# Patient Record
Sex: FEMALE | Race: WHITE
[De-identification: names, ages, dates, MRNs, and addresses within clinical notes are randomized per-mention and may not be internally consistent; named-entity substitution may affect disease eponyms.]

---

## 2019-01-15 ENCOUNTER — HOSPITAL ENCOUNTER (EMERGENCY)
Dept: HOSPITAL 35 - ER | Age: 84
LOS: 1 days | Discharge: HOME | End: 2019-01-16
Payer: COMMERCIAL

## 2019-01-15 VITALS — HEIGHT: 65 IN | WEIGHT: 200 LBS | BODY MASS INDEX: 33.32 KG/M2

## 2019-01-15 DIAGNOSIS — E78.5: ICD-10-CM

## 2019-01-15 DIAGNOSIS — S91.312A: ICD-10-CM
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
## 2019-01-15 DIAGNOSIS — I10: ICD-10-CM
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
## 2019-01-15 DIAGNOSIS — S92.535A: Primary | ICD-10-CM

## 2019-01-15 DIAGNOSIS — I48.91: ICD-10-CM

## 2019-01-15 DIAGNOSIS — M47.817: ICD-10-CM
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
## 2019-01-15 DIAGNOSIS — Z90.710: ICD-10-CM
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
## 2019-01-15 DIAGNOSIS — Z87.891: ICD-10-CM

## 2019-01-15 DIAGNOSIS — J44.9: ICD-10-CM

## 2019-01-16 VITALS — SYSTOLIC BLOOD PRESSURE: 116 MMHG | DIASTOLIC BLOOD PRESSURE: 46 MMHG

## 2019-07-23 ENCOUNTER — HOSPITAL ENCOUNTER (INPATIENT)
Dept: HOSPITAL 35 - ER | Age: 84
LOS: 6 days | Discharge: NURSING, REHAB OR LONG-TERM CARE | DRG: 189 | End: 2019-07-29
Attending: INTERNAL MEDICINE | Admitting: INTERNAL MEDICINE
Payer: COMMERCIAL

## 2019-07-23 VITALS — DIASTOLIC BLOOD PRESSURE: 71 MMHG | SYSTOLIC BLOOD PRESSURE: 167 MMHG

## 2019-07-23 VITALS — DIASTOLIC BLOOD PRESSURE: 72 MMHG | SYSTOLIC BLOOD PRESSURE: 149 MMHG

## 2019-07-23 VITALS — BODY MASS INDEX: 31.24 KG/M2 | HEIGHT: 65 IN | WEIGHT: 187.5 LBS

## 2019-07-23 DIAGNOSIS — Z98.41: ICD-10-CM

## 2019-07-23 DIAGNOSIS — Z66: ICD-10-CM

## 2019-07-23 DIAGNOSIS — J44.1: ICD-10-CM

## 2019-07-23 DIAGNOSIS — J96.22: ICD-10-CM

## 2019-07-23 DIAGNOSIS — Z79.899: ICD-10-CM

## 2019-07-23 DIAGNOSIS — I48.2: ICD-10-CM

## 2019-07-23 DIAGNOSIS — I50.32: ICD-10-CM

## 2019-07-23 DIAGNOSIS — E78.5: ICD-10-CM

## 2019-07-23 DIAGNOSIS — Z87.891: ICD-10-CM

## 2019-07-23 DIAGNOSIS — Z98.42: ICD-10-CM

## 2019-07-23 DIAGNOSIS — Z90.710: ICD-10-CM

## 2019-07-23 DIAGNOSIS — Z99.81: ICD-10-CM

## 2019-07-23 DIAGNOSIS — I11.0: ICD-10-CM

## 2019-07-23 DIAGNOSIS — J96.21: Primary | ICD-10-CM

## 2019-07-23 LAB
ALBUMIN SERPL-MCNC: 3.5 G/DL (ref 3.4–5)
ALT SERPL-CCNC: 23 U/L (ref 30–65)
ANION GAP SERPL CALC-SCNC: < 0 MMOL/L (ref 7–16)
ANISOCYTOSIS BLD QL SMEAR: (no result)
AST SERPL-CCNC: 20 U/L (ref 15–37)
BASO STIPL BLD QL SMEAR: (no result)
BILIRUB SERPL-MCNC: 0.5 MG/DL
BUN SERPL-MCNC: 24 MG/DL (ref 7–18)
CALCIUM SERPL-MCNC: 9.1 MG/DL (ref 8.5–10.1)
CHLORIDE SERPL-SCNC: 101 MMOL/L (ref 98–107)
CO2 SERPL-SCNC: 42 MMOL/L (ref 21–32)
CREAT SERPL-MCNC: 1 MG/DL (ref 0.6–1)
EOSINOPHIL NFR BLD: 2 % (ref 0–3)
ERYTHROCYTE [DISTWIDTH] IN BLOOD BY AUTOMATED COUNT: 17.9 % (ref 10.5–14.5)
GLUCOSE SERPL-MCNC: 105 MG/DL (ref 74–106)
GRANULOCYTES NFR BLD MANUAL: 84 % (ref 36–66)
HCT VFR BLD CALC: 40.6 % (ref 37–47)
HGB BLD-MCNC: 12.3 GM/DL (ref 12–15)
LYMPHOCYTES NFR BLD AUTO: 7 % (ref 24–44)
MCH RBC QN AUTO: 24.6 PG (ref 26–34)
MCHC RBC AUTO-ENTMCNC: 30.2 G/DL (ref 28–37)
MCV RBC: 81.4 FL (ref 80–100)
MONOCYTES NFR BLD: 7 % (ref 1–8)
NEUTROPHILS # BLD: 9.9 THOU/UL (ref 1.4–8.2)
PLATELET # BLD: 230 THOU/UL (ref 150–400)
POLYCHROMASIA BLD QL SMEAR: (no result)
POTASSIUM SERPL-SCNC: 4.4 MMOL/L (ref 3.5–5.1)
PROT SERPL-MCNC: 8.5 G/DL (ref 6.4–8.2)
RBC # BLD AUTO: 4.99 MIL/UL (ref 4.2–5)
SODIUM SERPL-SCNC: 142 MMOL/L (ref 136–145)
TROPONIN I SERPL-MCNC: <0.06 NG/ML (ref ?–0.06)
WBC # BLD AUTO: 11.8 THOU/UL (ref 4–11)

## 2019-07-23 PROCEDURE — 10081 I&D PILONIDAL CYST COMP: CPT

## 2019-07-24 VITALS — SYSTOLIC BLOOD PRESSURE: 105 MMHG | DIASTOLIC BLOOD PRESSURE: 55 MMHG

## 2019-07-24 VITALS — SYSTOLIC BLOOD PRESSURE: 140 MMHG | DIASTOLIC BLOOD PRESSURE: 59 MMHG

## 2019-07-24 VITALS — SYSTOLIC BLOOD PRESSURE: 167 MMHG | DIASTOLIC BLOOD PRESSURE: 97 MMHG

## 2019-07-24 VITALS — DIASTOLIC BLOOD PRESSURE: 71 MMHG | SYSTOLIC BLOOD PRESSURE: 136 MMHG

## 2019-07-24 LAB
BE(VIVO): 9.9 MMOL/L
HCO3 BLD-SCNC: 37.8 MMOL/L (ref 22–26)
PCO2 BLD: 68.4 MMHG (ref 35–45)
PO2 BLD: 58 MMHG (ref 80–100)

## 2019-07-24 NOTE — NUR
PT ADMITED FORM ED AROUND 2230. PT STATES INCREASED SOA.  ASSESSMENT AS
CHARTED VSS. PT DENIES PAIN OR CONCERN. A&O2-4 PT FORGETFUL AND CONFUSED AT
TIMES, REORIENTED. 6 L HIGH FLOW NC. PT STATS O2 3 L HS AT HOME. ABX AND
STEROIDS PER EMAR. ADMISSION MED RECONCIL COMPLETE. NP SAW PT AT BEDSIDE. WILL
CONTINUE TO MONITOR AND WITH POC.

## 2019-07-24 NOTE — NUR
Assumed care of patient approximately 1730.  Pt alert and eating her dinner.
Pt has difficulty answering orientation questions but is able to engage in
conversation.  O2 at 3 liters. Atrial fib on monitor. Report given to RN
assuming care. Fall precautions in place.

## 2019-07-25 VITALS — SYSTOLIC BLOOD PRESSURE: 142 MMHG | DIASTOLIC BLOOD PRESSURE: 63 MMHG

## 2019-07-25 VITALS — DIASTOLIC BLOOD PRESSURE: 78 MMHG | SYSTOLIC BLOOD PRESSURE: 122 MMHG

## 2019-07-25 VITALS — DIASTOLIC BLOOD PRESSURE: 62 MMHG | SYSTOLIC BLOOD PRESSURE: 123 MMHG

## 2019-07-25 VITALS — DIASTOLIC BLOOD PRESSURE: 53 MMHG | SYSTOLIC BLOOD PRESSURE: 110 MMHG

## 2019-07-25 VITALS — DIASTOLIC BLOOD PRESSURE: 66 MMHG | SYSTOLIC BLOOD PRESSURE: 127 MMHG

## 2019-07-25 LAB
ANION GAP SERPL CALC-SCNC: 5 MMOL/L (ref 7–16)
BUN SERPL-MCNC: 30 MG/DL (ref 7–18)
CALCIUM SERPL-MCNC: 9 MG/DL (ref 8.5–10.1)
CHLORIDE SERPL-SCNC: 100 MMOL/L (ref 98–107)
CO2 SERPL-SCNC: 37 MMOL/L (ref 21–32)
CREAT SERPL-MCNC: 0.8 MG/DL (ref 0.6–1)
ERYTHROCYTE [DISTWIDTH] IN BLOOD BY AUTOMATED COUNT: 17.8 % (ref 10.5–14.5)
GLUCOSE SERPL-MCNC: 156 MG/DL (ref 74–106)
HCT VFR BLD CALC: 37.7 % (ref 37–47)
HGB BLD-MCNC: 11.4 GM/DL (ref 12–15)
MCH RBC QN AUTO: 24.2 PG (ref 26–34)
MCHC RBC AUTO-ENTMCNC: 30.3 G/DL (ref 28–37)
MCV RBC: 80 FL (ref 80–100)
PLATELET # BLD: 248 THOU/UL (ref 150–400)
POTASSIUM SERPL-SCNC: 3.9 MMOL/L (ref 3.5–5.1)
RBC # BLD AUTO: 4.71 MIL/UL (ref 4.2–5)
SODIUM SERPL-SCNC: 142 MMOL/L (ref 136–145)
WBC # BLD AUTO: 13.2 THOU/UL (ref 4–11)

## 2019-07-25 NOTE — NUR
ASSUMED PT CARE AT 1900. PT ALERT, ORIENTED TO SELF, CONFUSED AND FORGETFUL.
VITAL SIGNS STABLE, ASSESSMENT AS CHARTED. NO COMPLAINTS OF PAIN. PT CHANGED
AS NEEDED. FREQUENT CHECKS. NO ACUTE CHANGES THROUGH THE NIGHT. PT RESTED WELL
THROUGH THE NIGHT. WILL CONTINUE TO MONITOR.

## 2019-07-25 NOTE — NUR
ASSUMED CARE OF PATIENT AT 0700. PATIENT IS ALERT TO SELF ONLY AND INCREDIBLY
PLEASANT. PATIENT IS AFIB ON THE MONITOR. ASSESSMENTS COMPLETED. PATIENT
RECEIVED IV ABX AND COMPLETED TREATMENT WITHOUT COMPLAINING OR FIDGITING WITH
IV SITES. PATIENT COMPLIANT WITH ALL TREATMENTS. DENIES ANY PAIN. PATIENT HAS
NON PRODUCTIVE COUGH. PATIENT IS TO CONTINUE WITH POC.

## 2019-07-26 VITALS — SYSTOLIC BLOOD PRESSURE: 149 MMHG | DIASTOLIC BLOOD PRESSURE: 57 MMHG

## 2019-07-26 VITALS — SYSTOLIC BLOOD PRESSURE: 130 MMHG | DIASTOLIC BLOOD PRESSURE: 69 MMHG

## 2019-07-26 VITALS — SYSTOLIC BLOOD PRESSURE: 148 MMHG | DIASTOLIC BLOOD PRESSURE: 60 MMHG

## 2019-07-26 VITALS — DIASTOLIC BLOOD PRESSURE: 78 MMHG | SYSTOLIC BLOOD PRESSURE: 136 MMHG

## 2019-07-26 NOTE — NUR
FAXED CLINICAL UPDATE TO [facility] SPOKE WITH CYRUS IN ADM THAT PT POSS.
DC OVER WEEKEND. IF PT DISCHARGES PLEASE FAX DC ORDERS/SUMMARY [phone]
AND CALL REPORT [phone] AND THEY WILL ARRANGE TRANSPORTATION.

## 2019-07-26 NOTE — NUR
ASSESSMENT AS CHARTED, VSS, ALERT AND ORIENTED TO SELF, PATIENT INCONTINENT OF
BOWEL AND BLADDER, UP TO CHAIR WITH PT, WILL CONTINUE TO MONITOR.

## 2019-07-26 NOTE — NUR
ASSESSMENTS AS CHARTED. PATIENT PLEASANT, BUT CONFUSSED ABOUT EVERYTING BUT
WHO SHE IS AND HER BIRTHDAY. PATIENT CURRENTLY LIVES AT [facility].
INCONTINENT OF BOWEL AND BLADDER. PATIENT WORKING WITH PT/OT WHILE HERE. PER
FAMILY SHE NORMALLY SITS IN A WHEELCHAIR AND PROPELS HERSELF AROUND THE
FACILITY. FALL PRECAUTIONS IN PLACE, NOT IMPULSIVE. PLANS TO RETURN TO
[facility] TO CONTINUE PT/OT.

## 2019-07-27 VITALS — DIASTOLIC BLOOD PRESSURE: 67 MMHG | SYSTOLIC BLOOD PRESSURE: 152 MMHG

## 2019-07-27 VITALS — DIASTOLIC BLOOD PRESSURE: 58 MMHG | SYSTOLIC BLOOD PRESSURE: 142 MMHG

## 2019-07-27 VITALS — SYSTOLIC BLOOD PRESSURE: 133 MMHG | DIASTOLIC BLOOD PRESSURE: 65 MMHG

## 2019-07-27 VITALS — DIASTOLIC BLOOD PRESSURE: 65 MMHG | SYSTOLIC BLOOD PRESSURE: 146 MMHG

## 2019-07-27 VITALS — SYSTOLIC BLOOD PRESSURE: 117 MMHG | DIASTOLIC BLOOD PRESSURE: 55 MMHG

## 2019-07-27 LAB
ANION GAP SERPL CALC-SCNC: 2 MMOL/L (ref 7–16)
BUN SERPL-MCNC: 35 MG/DL (ref 7–18)
CALCIUM SERPL-MCNC: 9.1 MG/DL (ref 8.5–10.1)
CHLORIDE SERPL-SCNC: 102 MMOL/L (ref 98–107)
CO2 SERPL-SCNC: 39 MMOL/L (ref 21–32)
CREAT SERPL-MCNC: 0.8 MG/DL (ref 0.6–1)
ERYTHROCYTE [DISTWIDTH] IN BLOOD BY AUTOMATED COUNT: 17.7 % (ref 10.5–14.5)
GLUCOSE SERPL-MCNC: 156 MG/DL (ref 74–106)
HCT VFR BLD CALC: 40.3 % (ref 37–47)
HGB BLD-MCNC: 12.2 GM/DL (ref 12–15)
MCH RBC QN AUTO: 24.5 PG (ref 26–34)
MCHC RBC AUTO-ENTMCNC: 30.3 G/DL (ref 28–37)
MCV RBC: 80.7 FL (ref 80–100)
PLATELET # BLD: 245 THOU/UL (ref 150–400)
POTASSIUM SERPL-SCNC: 4.2 MMOL/L (ref 3.5–5.1)
RBC # BLD AUTO: 4.99 MIL/UL (ref 4.2–5)
SODIUM SERPL-SCNC: 143 MMOL/L (ref 136–145)
WBC # BLD AUTO: 11.7 THOU/UL (ref 4–11)

## 2019-07-27 NOTE — NUR
ASSESSMENTS AS CHARTED. PATIENT STARTED ON EXTERNAL FEMALE CATHETER ATTACHED
TO SUCTION AFTER RECEIVING REPORT OF FREQUENT INCONTINENT URINATION. PATIENT
ON 3 LITERS O2. CARE PLAN IS TO WEAN PATIENT OFF STEROIDS AND TRY TO WEAN DOWN
OXYGEN NEEDS.

## 2019-07-28 VITALS — DIASTOLIC BLOOD PRESSURE: 64 MMHG | SYSTOLIC BLOOD PRESSURE: 139 MMHG

## 2019-07-28 VITALS — DIASTOLIC BLOOD PRESSURE: 66 MMHG | SYSTOLIC BLOOD PRESSURE: 135 MMHG

## 2019-07-28 VITALS — SYSTOLIC BLOOD PRESSURE: 130 MMHG | DIASTOLIC BLOOD PRESSURE: 61 MMHG

## 2019-07-28 VITALS — SYSTOLIC BLOOD PRESSURE: 115 MMHG | DIASTOLIC BLOOD PRESSURE: 56 MMHG

## 2019-07-28 VITALS — SYSTOLIC BLOOD PRESSURE: 116 MMHG | DIASTOLIC BLOOD PRESSURE: 45 MMHG

## 2019-07-28 NOTE — NUR
ASSESSMENTS AS CHARTED. PATIENT RESTING IN BED DURING SHIFT. PATIENT RECEIVING
IV STEROIDS AND ANTIBIOTICS. USING EXTERNAL FEMALE CATHETER. FALL PREVENTION
IN PLACE. PLAN OF CARE TO WEAN OFF STEROIDS AND RETURN TO [place] WITH
SKILLED CARE.

## 2019-07-29 VITALS — DIASTOLIC BLOOD PRESSURE: 62 MMHG | SYSTOLIC BLOOD PRESSURE: 140 MMHG

## 2019-07-29 VITALS — SYSTOLIC BLOOD PRESSURE: 151 MMHG | DIASTOLIC BLOOD PRESSURE: 82 MMHG

## 2019-07-29 VITALS — DIASTOLIC BLOOD PRESSURE: 70 MMHG | SYSTOLIC BLOOD PRESSURE: 128 MMHG

## 2019-07-29 NOTE — NUR
PT DISCHARGING TODAY BACK TO [facility] FOR SKILLED STAY. FAXED DC
ORDERS/SUMMARY TO FACILITY SPOKE WITH CYRUS IN ADM SHE RECEIVED DC ORDERS
AND ARRANGED TRANSPORTATION VIA [facility] [employer] [facility] FOR [phone] WITH 4L 02. LEFT MSG WITH
BOTH SONS (MARIMAR/DOMINIC) THAT THEIR MOTHER IS DISCHARGING TODAY BACK TO
[facility] AND IF ANY QUESTIONS TO CALL DCP AND LEFT PHONE NUMBER TO CALL.
UNIT NOTIFIED AND CHART COPY PER US. RN TO CALL [phone].

## 2019-07-29 NOTE — NUR
ASSUMED CARE OF PT AT SHIFT CHANGE. ASSESSMENTS AS CHARTED. MEDS GIVEN PER
MAR. PT ALERT AND ORIENTEDX3, FORGETFUL AT TIMES. UP MAX ASSIST, 4L O2
TOLERATING WELL. NO C/O PAIN, DENIES CP, SOB. APPETITE ADEQUATE, URINE OUTPUT
ADEQUATE, VSS. DC ORDERS ACKNOWLEDGED AND IMPLEMENTED. AHMET CALLED FOR
REPORT, REPORT GIVEN TO NURSE JENNIFER. PT LEFT UNIT AT APPROX 1510 BY MARGARITO HATCH,
IV REMOVED, TELE REMOVED. PT LEFT WITH ALL BELONGINGS.

## 2019-07-29 NOTE — NUR
A/O X 3.DENIES PAIN AND SOB.ON O2 4L NC.REPOSITION AS NEEDED.ON PUREWICK
EXTERNAL CATH.MONITOR SHOWS AFIB.CONTROLLED.PT STATES SHE FEELS BETTER THIS
MORNING MORE THAN ANY OTHER DAYS.POSSIBILITY OF D/C TODAY.WILL MONITOR AND
CONTINUE POC.

## 2020-02-03 ENCOUNTER — HOSPITAL ENCOUNTER (INPATIENT)
Dept: HOSPITAL 35 - ER | Age: 85
LOS: 7 days | Discharge: NURSING, REHAB OR LONG-TERM CARE | DRG: 871 | End: 2020-02-10
Attending: INTERNAL MEDICINE | Admitting: INTERNAL MEDICINE
Payer: COMMERCIAL

## 2020-02-03 VITALS — DIASTOLIC BLOOD PRESSURE: 50 MMHG | SYSTOLIC BLOOD PRESSURE: 128 MMHG

## 2020-02-03 VITALS — DIASTOLIC BLOOD PRESSURE: 63 MMHG | SYSTOLIC BLOOD PRESSURE: 109 MMHG

## 2020-02-03 VITALS — SYSTOLIC BLOOD PRESSURE: 118 MMHG | DIASTOLIC BLOOD PRESSURE: 54 MMHG

## 2020-02-03 VITALS — SYSTOLIC BLOOD PRESSURE: 99 MMHG | DIASTOLIC BLOOD PRESSURE: 58 MMHG

## 2020-02-03 VITALS — HEIGHT: 65.98 IN | BODY MASS INDEX: 33.91 KG/M2 | WEIGHT: 210.98 LBS

## 2020-02-03 VITALS — DIASTOLIC BLOOD PRESSURE: 81 MMHG | SYSTOLIC BLOOD PRESSURE: 135 MMHG

## 2020-02-03 VITALS — SYSTOLIC BLOOD PRESSURE: 125 MMHG | DIASTOLIC BLOOD PRESSURE: 59 MMHG

## 2020-02-03 VITALS — SYSTOLIC BLOOD PRESSURE: 100 MMHG | DIASTOLIC BLOOD PRESSURE: 67 MMHG

## 2020-02-03 DIAGNOSIS — I10: ICD-10-CM

## 2020-02-03 DIAGNOSIS — N39.0: ICD-10-CM

## 2020-02-03 DIAGNOSIS — Z99.81: ICD-10-CM

## 2020-02-03 DIAGNOSIS — J96.21: ICD-10-CM

## 2020-02-03 DIAGNOSIS — Z79.01: ICD-10-CM

## 2020-02-03 DIAGNOSIS — I48.91: ICD-10-CM

## 2020-02-03 DIAGNOSIS — B96.20: ICD-10-CM

## 2020-02-03 DIAGNOSIS — A41.9: Primary | ICD-10-CM

## 2020-02-03 DIAGNOSIS — Z98.41: ICD-10-CM

## 2020-02-03 DIAGNOSIS — J44.1: ICD-10-CM

## 2020-02-03 DIAGNOSIS — M54.30: ICD-10-CM

## 2020-02-03 DIAGNOSIS — Z98.42: ICD-10-CM

## 2020-02-03 DIAGNOSIS — M46.96: ICD-10-CM

## 2020-02-03 DIAGNOSIS — Z79.899: ICD-10-CM

## 2020-02-03 DIAGNOSIS — Z66: ICD-10-CM

## 2020-02-03 DIAGNOSIS — F03.90: ICD-10-CM

## 2020-02-03 DIAGNOSIS — J96.22: ICD-10-CM

## 2020-02-03 DIAGNOSIS — E78.5: ICD-10-CM

## 2020-02-03 DIAGNOSIS — Z90.710: ICD-10-CM

## 2020-02-03 LAB
ANION GAP SERPL CALC-SCNC: < 0 MMOL/L (ref 7–16)
BASOPHILS NFR BLD AUTO: 0.5 % (ref 0–2)
BE(VIVO): 6.1 MMOL/L
BILIRUB UR-MCNC: NEGATIVE MG/DL
BUN SERPL-MCNC: 17 MG/DL (ref 7–18)
CALCIUM SERPL-MCNC: 9.2 MG/DL (ref 8.5–10.1)
CHLORIDE SERPL-SCNC: 98 MMOL/L (ref 98–107)
CO2 SERPL-SCNC: 42 MMOL/L (ref 21–32)
COLOR UR: YELLOW
CREAT SERPL-MCNC: 0.7 MG/DL (ref 0.6–1)
EOSINOPHIL NFR BLD: 1.8 % (ref 0–3)
ERYTHROCYTE [DISTWIDTH] IN BLOOD BY AUTOMATED COUNT: 17.6 % (ref 10.5–14.5)
GLUCOSE SERPL-MCNC: 215 MG/DL (ref 74–106)
GRANULOCYTES NFR BLD MANUAL: 77.1 % (ref 36–66)
HCO3 BLD-SCNC: 37 MMOL/L (ref 22–26)
HCT VFR BLD CALC: 40.8 % (ref 37–47)
HGB BLD-MCNC: 12.1 GM/DL (ref 12–15)
KETONES UR STRIP-MCNC: NEGATIVE MG/DL
LYMPHOCYTES NFR BLD AUTO: 10.6 % (ref 24–44)
MCH RBC QN AUTO: 24.3 PG (ref 26–34)
MCHC RBC AUTO-ENTMCNC: 29.6 G/DL (ref 28–37)
MCV RBC: 82 FL (ref 80–100)
MONOCYTES NFR BLD: 10 % (ref 1–8)
NEUTROPHILS # BLD: 11.2 THOU/UL (ref 1.4–8.2)
PCO2 BLD: 94.9 MMHG (ref 35–45)
PLATELET # BLD: 243 THOU/UL (ref 150–400)
PO2 BLD: 238.6 MMHG (ref 80–100)
POTASSIUM SERPL-SCNC: 4.7 MMOL/L (ref 3.5–5.1)
RBC # BLD AUTO: 4.98 MIL/UL (ref 4.2–5)
RBC # UR STRIP: (no result) /UL
RBC #/AREA URNS HPF: (no result) /HPF (ref 0–2)
SODIUM SERPL-SCNC: 138 MMOL/L (ref 136–145)
SP GR UR STRIP: >= 1.03 (ref 1–1.03)
TROPONIN I SERPL-MCNC: <0.06 NG/ML (ref ?–0.06)
URINE CLARITY: (no result)
URINE GLUCOSE-RANDOM*: NEGATIVE
URINE LEUKOCYTES-REFLEX: (no result)
URINE NITRITE-REFLEX: NEGATIVE
URINE PROTEIN (DIPSTICK): (no result)
UROBILINOGEN UR STRIP-ACNC: 1 E.U./DL (ref 0.2–1)
WBC # BLD AUTO: 14.5 THOU/UL (ref 4–11)

## 2020-02-03 PROCEDURE — 5A09357 ASSISTANCE WITH RESPIRATORY VENTILATION, LESS THAN 24 CONSECUTIVE HOURS, CONTINUOUS POSITIVE AIRWAY PRESSURE: ICD-10-PCS | Performed by: INTERNAL MEDICINE

## 2020-02-03 PROCEDURE — 10879: CPT

## 2020-02-03 PROCEDURE — 10078: CPT

## 2020-02-03 NOTE — NUR
PATIENT ARRIVED FROM THE ER AT 1245, RESP AND NURSES AT BEDSIDE. PATIENT ONLY
REPONDING TO PAINFUL STIMULI. PATIENT ON BIPAP AND TOLERATING IT WELL. PATIENT
MADE COMFORTABLE IN THEROOM ANDPLACED ON THE MONITOR. AS THE DAY WENT ON THE
PATIENT WASABLE TO WAKE UP AND FOLLOW COMMANDS, PATIENT CONTINUES ON BIPAP. NO
FURTHER CONCERNS AT THIS TIME. WILL CONTINUE TO MONITOR AND CARE PER PLAN OF
CARE.

## 2020-02-03 NOTE — NUR
ATTEMPTED TO CALL REPORT FOR PATIENT. WAS INFORMED TO CALL BACK IN APPROX. 10
MINUTES. WILL FOLLOW UP.

## 2020-02-04 VITALS — DIASTOLIC BLOOD PRESSURE: 67 MMHG | SYSTOLIC BLOOD PRESSURE: 123 MMHG

## 2020-02-04 VITALS — DIASTOLIC BLOOD PRESSURE: 55 MMHG | SYSTOLIC BLOOD PRESSURE: 121 MMHG

## 2020-02-04 VITALS — SYSTOLIC BLOOD PRESSURE: 131 MMHG | DIASTOLIC BLOOD PRESSURE: 63 MMHG

## 2020-02-04 VITALS — DIASTOLIC BLOOD PRESSURE: 58 MMHG | SYSTOLIC BLOOD PRESSURE: 136 MMHG

## 2020-02-04 VITALS — SYSTOLIC BLOOD PRESSURE: 135 MMHG | DIASTOLIC BLOOD PRESSURE: 89 MMHG

## 2020-02-04 VITALS — DIASTOLIC BLOOD PRESSURE: 51 MMHG | SYSTOLIC BLOOD PRESSURE: 133 MMHG

## 2020-02-04 VITALS — DIASTOLIC BLOOD PRESSURE: 94 MMHG | SYSTOLIC BLOOD PRESSURE: 120 MMHG

## 2020-02-04 VITALS — DIASTOLIC BLOOD PRESSURE: 56 MMHG | SYSTOLIC BLOOD PRESSURE: 106 MMHG

## 2020-02-04 VITALS — SYSTOLIC BLOOD PRESSURE: 118 MMHG | DIASTOLIC BLOOD PRESSURE: 56 MMHG

## 2020-02-04 VITALS — SYSTOLIC BLOOD PRESSURE: 135 MMHG | DIASTOLIC BLOOD PRESSURE: 73 MMHG

## 2020-02-04 VITALS — SYSTOLIC BLOOD PRESSURE: 132 MMHG | DIASTOLIC BLOOD PRESSURE: 43 MMHG

## 2020-02-04 VITALS — DIASTOLIC BLOOD PRESSURE: 86 MMHG | SYSTOLIC BLOOD PRESSURE: 146 MMHG

## 2020-02-04 VITALS — SYSTOLIC BLOOD PRESSURE: 138 MMHG | DIASTOLIC BLOOD PRESSURE: 64 MMHG

## 2020-02-04 VITALS — SYSTOLIC BLOOD PRESSURE: 101 MMHG | DIASTOLIC BLOOD PRESSURE: 50 MMHG

## 2020-02-04 VITALS — DIASTOLIC BLOOD PRESSURE: 48 MMHG | SYSTOLIC BLOOD PRESSURE: 124 MMHG

## 2020-02-04 VITALS — DIASTOLIC BLOOD PRESSURE: 59 MMHG | SYSTOLIC BLOOD PRESSURE: 132 MMHG

## 2020-02-04 VITALS — DIASTOLIC BLOOD PRESSURE: 76 MMHG | SYSTOLIC BLOOD PRESSURE: 127 MMHG

## 2020-02-04 VITALS — DIASTOLIC BLOOD PRESSURE: 49 MMHG | SYSTOLIC BLOOD PRESSURE: 100 MMHG

## 2020-02-04 VITALS — SYSTOLIC BLOOD PRESSURE: 131 MMHG | DIASTOLIC BLOOD PRESSURE: 66 MMHG

## 2020-02-04 VITALS — DIASTOLIC BLOOD PRESSURE: 59 MMHG | SYSTOLIC BLOOD PRESSURE: 125 MMHG

## 2020-02-04 VITALS — SYSTOLIC BLOOD PRESSURE: 106 MMHG | DIASTOLIC BLOOD PRESSURE: 40 MMHG

## 2020-02-04 VITALS — DIASTOLIC BLOOD PRESSURE: 70 MMHG | SYSTOLIC BLOOD PRESSURE: 137 MMHG

## 2020-02-04 VITALS — DIASTOLIC BLOOD PRESSURE: 68 MMHG | SYSTOLIC BLOOD PRESSURE: 132 MMHG

## 2020-02-04 VITALS — DIASTOLIC BLOOD PRESSURE: 52 MMHG | SYSTOLIC BLOOD PRESSURE: 120 MMHG

## 2020-02-04 VITALS — SYSTOLIC BLOOD PRESSURE: 124 MMHG | DIASTOLIC BLOOD PRESSURE: 51 MMHG

## 2020-02-04 VITALS — SYSTOLIC BLOOD PRESSURE: 156 MMHG | DIASTOLIC BLOOD PRESSURE: 83 MMHG

## 2020-02-04 VITALS — DIASTOLIC BLOOD PRESSURE: 98 MMHG | SYSTOLIC BLOOD PRESSURE: 139 MMHG

## 2020-02-04 VITALS — SYSTOLIC BLOOD PRESSURE: 126 MMHG | DIASTOLIC BLOOD PRESSURE: 59 MMHG

## 2020-02-04 VITALS — DIASTOLIC BLOOD PRESSURE: 59 MMHG | SYSTOLIC BLOOD PRESSURE: 126 MMHG

## 2020-02-04 VITALS — DIASTOLIC BLOOD PRESSURE: 36 MMHG | SYSTOLIC BLOOD PRESSURE: 135 MMHG

## 2020-02-04 VITALS — SYSTOLIC BLOOD PRESSURE: 123 MMHG | DIASTOLIC BLOOD PRESSURE: 59 MMHG

## 2020-02-04 VITALS — SYSTOLIC BLOOD PRESSURE: 117 MMHG | DIASTOLIC BLOOD PRESSURE: 93 MMHG

## 2020-02-04 VITALS — DIASTOLIC BLOOD PRESSURE: 60 MMHG | SYSTOLIC BLOOD PRESSURE: 139 MMHG

## 2020-02-04 VITALS — SYSTOLIC BLOOD PRESSURE: 130 MMHG | DIASTOLIC BLOOD PRESSURE: 67 MMHG

## 2020-02-04 VITALS — SYSTOLIC BLOOD PRESSURE: 136 MMHG | DIASTOLIC BLOOD PRESSURE: 67 MMHG

## 2020-02-04 VITALS — SYSTOLIC BLOOD PRESSURE: 118 MMHG | DIASTOLIC BLOOD PRESSURE: 60 MMHG

## 2020-02-04 VITALS — SYSTOLIC BLOOD PRESSURE: 122 MMHG | DIASTOLIC BLOOD PRESSURE: 52 MMHG

## 2020-02-04 LAB
ANION GAP SERPL CALC-SCNC: 5 MMOL/L (ref 7–16)
BUN SERPL-MCNC: 30 MG/DL (ref 7–18)
CALCIUM SERPL-MCNC: 9 MG/DL (ref 8.5–10.1)
CHLORIDE SERPL-SCNC: 102 MMOL/L (ref 98–107)
CO2 SERPL-SCNC: 35 MMOL/L (ref 21–32)
CREAT SERPL-MCNC: 0.7 MG/DL (ref 0.6–1)
ERYTHROCYTE [DISTWIDTH] IN BLOOD BY AUTOMATED COUNT: 17.1 % (ref 10.5–14.5)
GLUCOSE SERPL-MCNC: 151 MG/DL (ref 74–106)
HCT VFR BLD CALC: 37.3 % (ref 37–47)
HGB BLD-MCNC: 11.5 GM/DL (ref 12–15)
MCH RBC QN AUTO: 24.8 PG (ref 26–34)
MCHC RBC AUTO-ENTMCNC: 30.8 G/DL (ref 28–37)
MCV RBC: 80.4 FL (ref 80–100)
PLATELET # BLD: 255 THOU/UL (ref 150–400)
POTASSIUM SERPL-SCNC: 5 MMOL/L (ref 3.5–5.1)
RBC # BLD AUTO: 4.64 MIL/UL (ref 4.2–5)
SODIUM SERPL-SCNC: 142 MMOL/L (ref 136–145)
WBC # BLD AUTO: 13.5 THOU/UL (ref 4–11)

## 2020-02-04 PROCEDURE — 5A09357 ASSISTANCE WITH RESPIRATORY VENTILATION, LESS THAN 24 CONSECUTIVE HOURS, CONTINUOUS POSITIVE AIRWAY PRESSURE: ICD-10-PCS | Performed by: INTERNAL MEDICINE

## 2020-02-04 NOTE — NUR
chart review, report from icu bedside nurse. pt up in bed, eye closed bipap
and resp therapy giving btx. per nurse pt more alert today to names, know she
in hospital. cm left message for brother sunni mares. navi spoke with cg at
[facility], " yes lives there ltc and she was going to move to the [place] [facility] in
locked unite on monday but took side trip 1st. she all over place self
propeling her wheel chair. she will destat when she takes her o2 off often.
she usually on 4L per nc. she has dementia, she is A & o x 2 with
forgetfulness.  its not that she is non-compliant it just her dementia and
will refuse cares for adl's.  she can feed self with set up. incontinent.
"/[facility] care giver.  will cont following as needed for dc needs.

## 2020-02-04 NOTE — NUR
PATIENT IS ON 8L HIGH FLOW NC. OFF BIPAP SENSE 1300. PATIENT HAS INTERMIENT
CONFUSION AT TIMES DUE TO DEMENTIA HX. PATIENT UNDERSTANDS SHES IN THE
HOSPITAL AND THE TIME ON DAY, THE MONTH AND WHO SHE IS. PATIENT IS RESTING
COMFORTABLY IN BED. WCM.

## 2020-02-04 NOTE — H
[facility]
[address]
[place]   [address]                     HISTORY AND PHYSICAL          
_______________________________________________________________________________
 
Name:       ALEXEI AQUINO            Room #:         241-P       ADM IN  
M.R.#:      [id number]                       Account #:      [id number]  
Admission:  02/03/20    Attend Phys:    LILO Jaffe
Discharge:              Date of Birth:  [birth year]  
                                                          Report #: [id number]
                                                                    [id number]   
_______________________________________________________________________________
THIS REPORT FOR:   //name//                          
 
CC: Nathan Kee
 
DATE OF SERVICE:  02/03/2020
 
 
CHIEF COMPLAINT:  Shortness of breath.
 
HISTORY OF PRESENT ILLNESS:  The patient is an 84-year-old female with a history
of COPD, who was sent to the Emergency Room from [facility] for
respiratory distress.  She was found at [facility] this morning in her room with
oxygen saturation noted as 49% on 2 liters of oxygen.  They gave her 2 breathing
treatments and called EMS and was brought to the Emergency Room.  She has been
drowsy and minimally alert, was placed on BiPAP.  She was admitted to ICU.
 
PAST MEDICAL HISTORY:  COPD, oxygen dependent, 2-4 liters nasal cannula.  She
had a history of hypercapnic hypoxic respiratory failure in 2019 requiring BiPAP
treatment, hypertension, dyslipidemia, history of atrial fibrillation, chronic
anticoagulation with Eliquis, lumbar degenerative arthritis.
 
PAST SURGICAL HISTORY:  She has had a hysterectomy.
 
FAMILY HISTORY:  Noncontributory.
 
SOCIAL HISTORY:  She has been living at [facility] for about
the last 2-3 years.  She was no longer able to care for herself at home.  She
has a 60-pack-year history of smoking, but quit few years ago.  No known chronic
alcohol history.
 
ALLERGIES:  None.
 
MEDICATIONS:  DuoNeb, lisinopril, Lipitor, Lasix, Breo-Ellipta, Pepcid,
verapamil, metoprolol, Eliquis 5 mg twice a day and recently doxycycline and
prednisone.
 
REVIEW OF SYSTEMS:  She is unable to give review.
 
OBJECTIVE:
VITAL SIGNS:  Temperature 37.3, pulse 91, respirations 16, blood pressure 99/58,
O2 sat 90-95% on BiPAP.
GENERAL:  She is lethargic, minimally arousable.
HEAD AND NECK:  Unremarkable.
LUNGS:  Diminished breath sounds.
HEART:  Regular, no murmur.
 
 
 
[facility]
[address]
[place]   [address]                     HISTORY AND PHYSICAL          
_______________________________________________________________________________
 
Name:       ALEXEI AQUINO            Room #:         [address] [place] [address] [place]  
[facility].#:      [id number]                       Account #:      [id number]  
Admission:  02/03/20    Attend Phys:    LILO Jaffe
Discharge:              Date of Birth:  [birth year]  
                                                          Report #: [id number]
                                                                    [id number]   
_______________________________________________________________________________
ABDOMEN:  Soft, normoactive bowel sounds.
EXTREMITIES:  No edema.
NEUROLOGIC:  She moves spontaneously all extremities.
 
LABORATORY DATA:  Reviewed.  Pertinent findings are pH 7.21, pCO2 of 95, pO2 of
240.  White count was 14, CO2 was 42.
 
Chest x-ray reviewed.
 
ASSESSMENT:
1.  Acute hypercapnic hypoxic respiratory failure.
2.  Chronic obstructive pulmonary disease, O2 dependent.
 
PLAN:  She has been admitted to ICU, placed on BiPAP and supportive measures
including antibiotics, steroids, and nebulized treatments.  I have spoken to her
brother at bedside and we will continue do not resuscitate orders as has been
her previous admission here and standing orders from [facility].
 
 
 
 
 
 
 
 
 
 
 
 
 
 
 
 
 
 
 
 
 
 
 
 
 
 
 
  <ELECTRONICALLY SIGNED>
   By: Niels Kee MD           
  02/04/20     1351
D: 02/03/20 1426                           _____________________________________
T: 02/03/20 1500                           Niels Kee MD             /nt

## 2020-02-04 NOTE — NUR
WOUND CARE CONSULT
ASSESSED SACRAL/COCCYX W/ STAFF RN, AREA PINK BLANCHABLE, NO WOUNDS, STAFF RN
STATES PT WAS WEARING DEPENDS/BRIEFS AT SNF, STATES AREA SLIGHTLY RED
YESTERDAY, PROBABLY DUE TO INCONT ASSOCIATED DERMATITIS
 
RECOMMENDATIONS
USE BARRIER MOISTURE CREAM BID, PRN, TURN MINIMAL Q2 HOURS AND PRN, PRESSURE
RELIEF, OFF LOADING, SUGGESTED PUREWICK CATH DUE TO INCONT
STAFF RN AWARE

## 2020-02-05 VITALS — SYSTOLIC BLOOD PRESSURE: 116 MMHG | DIASTOLIC BLOOD PRESSURE: 59 MMHG

## 2020-02-05 VITALS — DIASTOLIC BLOOD PRESSURE: 67 MMHG | SYSTOLIC BLOOD PRESSURE: 123 MMHG

## 2020-02-05 VITALS — SYSTOLIC BLOOD PRESSURE: 132 MMHG | DIASTOLIC BLOOD PRESSURE: 71 MMHG

## 2020-02-05 VITALS — SYSTOLIC BLOOD PRESSURE: 144 MMHG | DIASTOLIC BLOOD PRESSURE: 58 MMHG

## 2020-02-05 VITALS — DIASTOLIC BLOOD PRESSURE: 73 MMHG | SYSTOLIC BLOOD PRESSURE: 136 MMHG

## 2020-02-05 VITALS — DIASTOLIC BLOOD PRESSURE: 58 MMHG | SYSTOLIC BLOOD PRESSURE: 130 MMHG

## 2020-02-05 VITALS — DIASTOLIC BLOOD PRESSURE: 67 MMHG | SYSTOLIC BLOOD PRESSURE: 115 MMHG

## 2020-02-05 VITALS — SYSTOLIC BLOOD PRESSURE: 138 MMHG | DIASTOLIC BLOOD PRESSURE: 66 MMHG

## 2020-02-05 VITALS — DIASTOLIC BLOOD PRESSURE: 60 MMHG | SYSTOLIC BLOOD PRESSURE: 150 MMHG

## 2020-02-05 VITALS — SYSTOLIC BLOOD PRESSURE: 127 MMHG | DIASTOLIC BLOOD PRESSURE: 57 MMHG

## 2020-02-05 VITALS — SYSTOLIC BLOOD PRESSURE: 124 MMHG | DIASTOLIC BLOOD PRESSURE: 62 MMHG

## 2020-02-05 VITALS — SYSTOLIC BLOOD PRESSURE: 131 MMHG | DIASTOLIC BLOOD PRESSURE: 75 MMHG

## 2020-02-05 VITALS — DIASTOLIC BLOOD PRESSURE: 53 MMHG | SYSTOLIC BLOOD PRESSURE: 115 MMHG

## 2020-02-05 VITALS — DIASTOLIC BLOOD PRESSURE: 80 MMHG | SYSTOLIC BLOOD PRESSURE: 151 MMHG

## 2020-02-05 VITALS — DIASTOLIC BLOOD PRESSURE: 52 MMHG | SYSTOLIC BLOOD PRESSURE: 151 MMHG

## 2020-02-05 VITALS — SYSTOLIC BLOOD PRESSURE: 153 MMHG | DIASTOLIC BLOOD PRESSURE: 59 MMHG

## 2020-02-05 VITALS — SYSTOLIC BLOOD PRESSURE: 140 MMHG | DIASTOLIC BLOOD PRESSURE: 68 MMHG

## 2020-02-05 VITALS — SYSTOLIC BLOOD PRESSURE: 140 MMHG | DIASTOLIC BLOOD PRESSURE: 67 MMHG

## 2020-02-05 VITALS — SYSTOLIC BLOOD PRESSURE: 132 MMHG | DIASTOLIC BLOOD PRESSURE: 51 MMHG

## 2020-02-05 VITALS — DIASTOLIC BLOOD PRESSURE: 74 MMHG | SYSTOLIC BLOOD PRESSURE: 146 MMHG

## 2020-02-05 LAB
ANION GAP SERPL CALC-SCNC: 5 MMOL/L (ref 7–16)
BE(VIVO): 4.9 MMOL/L
BUN SERPL-MCNC: 34 MG/DL (ref 7–18)
CALCIUM SERPL-MCNC: 8.8 MG/DL (ref 8.5–10.1)
CHLORIDE SERPL-SCNC: 102 MMOL/L (ref 98–107)
CO2 SERPL-SCNC: 36 MMOL/L (ref 21–32)
CREAT SERPL-MCNC: 0.8 MG/DL (ref 0.6–1)
GLUCOSE SERPL-MCNC: 132 MG/DL (ref 74–106)
HCO3 BLD-SCNC: 35.1 MMOL/L (ref 22–26)
PCO2 BLD: 83.1 MMHG (ref 35–45)
PO2 BLD: 247.4 MMHG (ref 80–100)
POTASSIUM SERPL-SCNC: 4.8 MMOL/L (ref 3.5–5.1)
SODIUM SERPL-SCNC: 143 MMOL/L (ref 136–145)

## 2020-02-05 PROCEDURE — 5A09357 ASSISTANCE WITH RESPIRATORY VENTILATION, LESS THAN 24 CONSECUTIVE HOURS, CONTINUOUS POSITIVE AIRWAY PRESSURE: ICD-10-PCS | Performed by: INTERNAL MEDICINE

## 2020-02-05 NOTE — NUR
pt brother suzan called back, he confirmed that pt was supposed to move on
monday 2/3/2020 to the [facility] care and she got sick and ended up in
the hospital. she will still be moving to [facility] [place] but now from hospital to the
[place] , ok to contact the [place]"/suzan # [phone]. referral to be sent
to the [facility] [place] [facility] memory care unit.

## 2020-02-05 NOTE — NUR
FAXED REFERRAL TO THE [facility] RECEIVED CONFIRMATION AND LEFT MSG WITH [facility] IN
ADM THAT PT NEEDS MEMORY CARE UNIT. DP TO FOLLOW.

## 2020-02-05 NOTE — NUR
PATIENT ALERT TO SELF ONLY, CONSISTENT RE-ORIENTATION NEEDED. A-FIB ON
TELEMETRY MONITOR. ON BIPAP THROUGHT THE NIGHT40%FIO2. PATIENT RESTED
COMFORTABLY. PATIENT INCONTINENT OF URINE. PLAN OF CARE DISCUSSED. WILL
CONTINUE TO MONITOR.

## 2020-02-06 VITALS — SYSTOLIC BLOOD PRESSURE: 132 MMHG | DIASTOLIC BLOOD PRESSURE: 69 MMHG

## 2020-02-06 VITALS — DIASTOLIC BLOOD PRESSURE: 74 MMHG | SYSTOLIC BLOOD PRESSURE: 143 MMHG

## 2020-02-06 VITALS — DIASTOLIC BLOOD PRESSURE: 73 MMHG | SYSTOLIC BLOOD PRESSURE: 146 MMHG

## 2020-02-06 VITALS — DIASTOLIC BLOOD PRESSURE: 52 MMHG | SYSTOLIC BLOOD PRESSURE: 145 MMHG

## 2020-02-06 VITALS — SYSTOLIC BLOOD PRESSURE: 140 MMHG | DIASTOLIC BLOOD PRESSURE: 74 MMHG

## 2020-02-06 VITALS — DIASTOLIC BLOOD PRESSURE: 91 MMHG | SYSTOLIC BLOOD PRESSURE: 130 MMHG

## 2020-02-06 VITALS — SYSTOLIC BLOOD PRESSURE: 148 MMHG | DIASTOLIC BLOOD PRESSURE: 64 MMHG

## 2020-02-06 VITALS — DIASTOLIC BLOOD PRESSURE: 66 MMHG | SYSTOLIC BLOOD PRESSURE: 144 MMHG

## 2020-02-06 VITALS — SYSTOLIC BLOOD PRESSURE: 143 MMHG | DIASTOLIC BLOOD PRESSURE: 61 MMHG

## 2020-02-06 VITALS — DIASTOLIC BLOOD PRESSURE: 64 MMHG | SYSTOLIC BLOOD PRESSURE: 142 MMHG

## 2020-02-06 VITALS — SYSTOLIC BLOOD PRESSURE: 146 MMHG | DIASTOLIC BLOOD PRESSURE: 71 MMHG

## 2020-02-06 VITALS — DIASTOLIC BLOOD PRESSURE: 71 MMHG | SYSTOLIC BLOOD PRESSURE: 161 MMHG

## 2020-02-06 VITALS — SYSTOLIC BLOOD PRESSURE: 135 MMHG | DIASTOLIC BLOOD PRESSURE: 67 MMHG

## 2020-02-06 VITALS — SYSTOLIC BLOOD PRESSURE: 143 MMHG | DIASTOLIC BLOOD PRESSURE: 83 MMHG

## 2020-02-06 VITALS — SYSTOLIC BLOOD PRESSURE: 138 MMHG | DIASTOLIC BLOOD PRESSURE: 57 MMHG

## 2020-02-06 VITALS — DIASTOLIC BLOOD PRESSURE: 64 MMHG | SYSTOLIC BLOOD PRESSURE: 144 MMHG

## 2020-02-06 VITALS — DIASTOLIC BLOOD PRESSURE: 80 MMHG | SYSTOLIC BLOOD PRESSURE: 143 MMHG

## 2020-02-06 VITALS — DIASTOLIC BLOOD PRESSURE: 107 MMHG | SYSTOLIC BLOOD PRESSURE: 155 MMHG

## 2020-02-06 VITALS — SYSTOLIC BLOOD PRESSURE: 145 MMHG | DIASTOLIC BLOOD PRESSURE: 111 MMHG

## 2020-02-06 LAB
ANION GAP SERPL CALC-SCNC: 3 MMOL/L (ref 7–16)
BUN SERPL-MCNC: 28 MG/DL (ref 7–18)
CALCIUM SERPL-MCNC: 8.7 MG/DL (ref 8.5–10.1)
CHLORIDE SERPL-SCNC: 102 MMOL/L (ref 98–107)
CO2 SERPL-SCNC: 34 MMOL/L (ref 21–32)
CREAT SERPL-MCNC: 0.6 MG/DL (ref 0.6–1)
ERYTHROCYTE [DISTWIDTH] IN BLOOD BY AUTOMATED COUNT: 17.4 % (ref 10.5–14.5)
GLUCOSE SERPL-MCNC: 165 MG/DL (ref 74–106)
HCT VFR BLD CALC: 38.1 % (ref 37–47)
HGB BLD-MCNC: 11.5 GM/DL (ref 12–15)
MCH RBC QN AUTO: 24.2 PG (ref 26–34)
MCHC RBC AUTO-ENTMCNC: 30.2 G/DL (ref 28–37)
MCV RBC: 79.9 FL (ref 80–100)
PLATELET # BLD: 190 THOU/UL (ref 150–400)
POTASSIUM SERPL-SCNC: 4.6 MMOL/L (ref 3.5–5.1)
RBC # BLD AUTO: 4.77 MIL/UL (ref 4.2–5)
SODIUM SERPL-SCNC: 139 MMOL/L (ref 136–145)
WBC # BLD AUTO: 9.9 THOU/UL (ref 4–11)

## 2020-02-06 PROCEDURE — 5A09357 ASSISTANCE WITH RESPIRATORY VENTILATION, LESS THAN 24 CONSECUTIVE HOURS, CONTINUOUS POSITIVE AIRWAY PRESSURE: ICD-10-PCS | Performed by: INTERNAL MEDICINE

## 2020-02-06 NOTE — NUR
ASSUMED CARE @ 0700 2/6/20, PT ASSESSMENTS AND VSS COMPLETE PER ICU PROTOCOL
INITIALLY, PT NOW CC TELE STATUS, PT HAD AN UNEVENTFUL DAY, PROGRESSING
TOWARDS POC.

## 2020-02-06 NOTE — NUR
discussed, pt possible move out of icu, possible dc to [facility] unit
when medical stable for dc. cm left message for sunni mares her brother.
bedside nurse to call report to , fax dc orders to ,
send chart copy with pt when she dc from hospital. will cont following as
needed for dc needs.

## 2020-02-06 NOTE — NUR
WOUND CARE F/U
ASSESSED SACRAL AREA W/ STAFF RN, NO SKIN BREAKDOWN, INTACT, AREA PINK,
ENCOURAGED PT TO TURN, OFF LOADING, PRESSURE RELIEF, INCONT OF STOOL. BARRIER
CREAM APPLIED, PURE WICK IN PLACE
 
RECOMMENDATIONS
CONT OFF LOADING, PRESSURE RELIEF, TURN Q2 HOURS, BARRIER CREAM DAILY AND PRN,
CONT PURE WICK, WILL SIGN OFF FOR WOUND CONSULT
STAFF RN AWARE

## 2020-02-06 NOTE — NUR
PATIENT ALERT AND ORENTED TO SELF THROUGHOUT THE NIGHT, THIS MORNING AT 0500
ALERT X4. INTERMITTENTLY CONFUSED, HISTORY OF DEMENTIA. ON BIPAP THROUGOUT THE
NIGHT ON 40% OXYGEN. PATIENT RESTED WELL WITH  NO RESPIRATORY DIFFICULTIES.
PLACED ON 4L HIGHFLOW NASAL CANNULA AT 0600, O2 SAT REMAINED ABOVE 95%. PLAN
DISCUSSED WITH PATIENT PENDING APPROVAL FROM PULMONARY TEAM TO START DIET.
PATIENT VERBALIZED UNDERSTANDING. NO SIGN OF ACUTE DISTRESS NOTED AT THIS
TIME. PATIENT PROGRESSING TOWARDS GOALS, WILL CONTINUE TO MONITOR.

## 2020-02-07 VITALS — DIASTOLIC BLOOD PRESSURE: 69 MMHG | SYSTOLIC BLOOD PRESSURE: 146 MMHG

## 2020-02-07 VITALS — SYSTOLIC BLOOD PRESSURE: 163 MMHG | DIASTOLIC BLOOD PRESSURE: 80 MMHG

## 2020-02-07 VITALS — SYSTOLIC BLOOD PRESSURE: 135 MMHG | DIASTOLIC BLOOD PRESSURE: 67 MMHG

## 2020-02-07 VITALS — DIASTOLIC BLOOD PRESSURE: 77 MMHG | SYSTOLIC BLOOD PRESSURE: 153 MMHG

## 2020-02-07 VITALS — DIASTOLIC BLOOD PRESSURE: 85 MMHG | SYSTOLIC BLOOD PRESSURE: 162 MMHG

## 2020-02-07 PROCEDURE — 5A09357 ASSISTANCE WITH RESPIRATORY VENTILATION, LESS THAN 24 CONSECUTIVE HOURS, CONTINUOUS POSITIVE AIRWAY PRESSURE: ICD-10-PCS | Performed by: INTERNAL MEDICINE

## 2020-02-07 NOTE — NUR
Nutrition followup: Pt admitted with COPD exacerbation, UTI.  No longer
on bipap. Diet advanced following ST eval. Apparent aspiration per Chart.
Eating fairly well, 50-90% of meals on mechanically altered ground, nectar
thick liquids diet. Coccyx redness but intact per wound care. Related to
incontinence. On steroids. No hx DM and no BG being taken. Stable weights,
obesity, class 2. RD obtained food preferences. Change pt to low risk due to
good appetite and no nutrition intervention planned at present.

## 2020-02-07 NOTE — NUR
No event in this shift. Pt remains stable. Resting well t/o the night. Well
richie BIPAP. Continue to be on swallowing precaution, no s/sx of any aspiration
indicates. Taking Po well. Unable to monitor accurate urine, her purewix
doesn't work properly. Changed pad x3 with great amoubt of urine noted.
No changes of skin issue, slowly progressing toward goals.

## 2020-02-07 NOTE — NUR
per md and md note pt will need to be off bipap at hs and then will be stable
for dc. cm provided update to estrella at [facility], they would like to try and
skill her if able to with her dementia, will check back on monday to see if
she is ready for dc"/[facility]rella. chart copy already requested for dc. cm provided
updates to brother suzan. will cont following as needed for dc needs. no
anticipate dc over weekend needs to be off bipap at  to dc to [facility]
skilled memory care.

## 2020-02-08 VITALS — SYSTOLIC BLOOD PRESSURE: 159 MMHG | DIASTOLIC BLOOD PRESSURE: 69 MMHG

## 2020-02-08 VITALS — SYSTOLIC BLOOD PRESSURE: 151 MMHG | DIASTOLIC BLOOD PRESSURE: 73 MMHG

## 2020-02-08 VITALS — DIASTOLIC BLOOD PRESSURE: 80 MMHG | SYSTOLIC BLOOD PRESSURE: 179 MMHG

## 2020-02-08 VITALS — SYSTOLIC BLOOD PRESSURE: 175 MMHG | DIASTOLIC BLOOD PRESSURE: 87 MMHG

## 2020-02-08 VITALS — DIASTOLIC BLOOD PRESSURE: 49 MMHG | SYSTOLIC BLOOD PRESSURE: 127 MMHG

## 2020-02-08 LAB
ANION GAP SERPL CALC-SCNC: 0 MMOL/L (ref 7–16)
BUN SERPL-MCNC: 24 MG/DL (ref 7–18)
CALCIUM SERPL-MCNC: 9 MG/DL (ref 8.5–10.1)
CHLORIDE SERPL-SCNC: 100 MMOL/L (ref 98–107)
CO2 SERPL-SCNC: 40 MMOL/L (ref 21–32)
CREAT SERPL-MCNC: 0.8 MG/DL (ref 0.6–1)
ERYTHROCYTE [DISTWIDTH] IN BLOOD BY AUTOMATED COUNT: 17.6 % (ref 10.5–14.5)
GLUCOSE SERPL-MCNC: 156 MG/DL (ref 74–106)
HCT VFR BLD CALC: 41.9 % (ref 37–47)
HGB BLD-MCNC: 12.7 GM/DL (ref 12–15)
MCH RBC QN AUTO: 24.3 PG (ref 26–34)
MCHC RBC AUTO-ENTMCNC: 30.3 G/DL (ref 28–37)
MCV RBC: 80.2 FL (ref 80–100)
PLATELET # BLD: 208 THOU/UL (ref 150–400)
POTASSIUM SERPL-SCNC: 4.2 MMOL/L (ref 3.5–5.1)
RBC # BLD AUTO: 5.22 MIL/UL (ref 4.2–5)
SODIUM SERPL-SCNC: 140 MMOL/L (ref 136–145)
WBC # BLD AUTO: 13.2 THOU/UL (ref 4–11)

## 2020-02-08 PROCEDURE — 5A09357 ASSISTANCE WITH RESPIRATORY VENTILATION, LESS THAN 24 CONSECUTIVE HOURS, CONTINUOUS POSITIVE AIRWAY PRESSURE: ICD-10-PCS | Performed by: INTERNAL MEDICINE

## 2020-02-08 NOTE — NUR
ASSESSMENTS AS CHARTED, MEDS CHARTED AS GIVEN. PATIENT WAS OFF BIPAP THE
ENTIRE SHIFT. DID WELL ON 3 LITERS NASAL CANNULA ALL NIGHT. RESTARTED EXTERNAL
FEMALE CATHETER TO SUCTION FOR INCONTINENCE. DENIED PAIN. BEDREST DURING
SHIFT. DENIED PAIN. PLAN OF CARE IS TO CONTINUE ANTIBIOTIC THERAPY, STEROID
THERAPY THEN GO TO THE [facility] SKILLED MEMORY CARE UNIT. VSS.

## 2020-02-08 NOTE — NUR
ASSUMED CARE AT SHIFT CHANGE, ALERT AND ORIENTED X4 BUT FORGETFUL.
SCHEDULED MEDS GIVEN, AND Q2 POSITIONED.
ASSESSMENT AS DOCUMENTED. AFIB WITH BBB ON THE MONITOR.
AND WILL COTINUE WITH POC.

## 2020-02-09 VITALS — SYSTOLIC BLOOD PRESSURE: 150 MMHG | DIASTOLIC BLOOD PRESSURE: 68 MMHG

## 2020-02-09 VITALS — DIASTOLIC BLOOD PRESSURE: 68 MMHG | SYSTOLIC BLOOD PRESSURE: 122 MMHG

## 2020-02-09 VITALS — DIASTOLIC BLOOD PRESSURE: 65 MMHG | SYSTOLIC BLOOD PRESSURE: 142 MMHG

## 2020-02-09 VITALS — SYSTOLIC BLOOD PRESSURE: 153 MMHG | DIASTOLIC BLOOD PRESSURE: 82 MMHG

## 2020-02-09 LAB
ANION GAP SERPL CALC-SCNC: 4 MMOL/L (ref 7–16)
BUN SERPL-MCNC: 33 MG/DL (ref 7–18)
CALCIUM SERPL-MCNC: 9.1 MG/DL (ref 8.5–10.1)
CHLORIDE SERPL-SCNC: 101 MMOL/L (ref 98–107)
CO2 SERPL-SCNC: 38 MMOL/L (ref 21–32)
CREAT SERPL-MCNC: 0.7 MG/DL (ref 0.6–1)
ERYTHROCYTE [DISTWIDTH] IN BLOOD BY AUTOMATED COUNT: 17.2 % (ref 10.5–14.5)
GLUCOSE SERPL-MCNC: 171 MG/DL (ref 74–106)
HCT VFR BLD CALC: 40.8 % (ref 37–47)
HGB BLD-MCNC: 12.1 GM/DL (ref 12–15)
MCH RBC QN AUTO: 23.8 PG (ref 26–34)
MCHC RBC AUTO-ENTMCNC: 29.6 G/DL (ref 28–37)
MCV RBC: 80.2 FL (ref 80–100)
PLATELET # BLD: 198 THOU/UL (ref 150–400)
POTASSIUM SERPL-SCNC: 4.6 MMOL/L (ref 3.5–5.1)
RBC # BLD AUTO: 5.08 MIL/UL (ref 4.2–5)
SODIUM SERPL-SCNC: 143 MMOL/L (ref 136–145)
WBC # BLD AUTO: 13.5 THOU/UL (ref 4–11)

## 2020-02-09 NOTE — NUR
ASSUMED CARE OF PT AT 0700. PT AOX2 IN NO ACUTE DISTRESS VOICING NO
COMPLAINTS.  INCONTINENT OR URINE - EXT PORRAS IN PLACE.  TOLERATE PO INTAKE
WELL.  VITALS STABLE.  ANTICIPATE D/C TOMORROW.

## 2020-02-09 NOTE — NUR
Pt. has slept well during the night. Repositioned prn for comfort. O2 at 3L/NC
with O2 sat in the mid 90's. No respiratory distress.Bed alarm on for safety
and SCD's on. Denies any discomfort. Making progress towards care plan goals.

## 2020-02-10 VITALS — DIASTOLIC BLOOD PRESSURE: 66 MMHG | SYSTOLIC BLOOD PRESSURE: 125 MMHG

## 2020-02-10 VITALS — SYSTOLIC BLOOD PRESSURE: 133 MMHG | DIASTOLIC BLOOD PRESSURE: 61 MMHG

## 2020-02-10 NOTE — NUR
Pt. slept well during the night. She has been repositioned for comfort. O2 at
3L/NC , no respiratory distress. External female catheter in place otherwise
incontinent. Making progress towards care plan goals.

## 2020-02-10 NOTE — NUR
DISCHARGE NOTE:
SW reviewed chart and spoke with nursing and attending physician. Pt was
transferred to 3 from ICU and is medically stable for discharge to [facility] Unit today. Discharge planner coordinated and notified family.
Wheelchair van transportation scheduled for [phone] per facility's
arrangements. CHart copy requested. Nursing to call report. No additional SW
needs identified at this time, but is available to assist should needs arise.

## 2020-02-10 NOTE — NUR
DISCHARGE ORDERS COMPLETED PER ATTENDING. PATIENT DISCHARGING TO [facility] AT
THE [facility]. DISCHARGE ORDERS FAXED TO [facility], ADMISSIONS COORDINATOR.
TRANSPORTATION ARRANGED PER [facility], 1530 HOURS. CHART COPY COMPLETED PER UNIT
.  BROTHER MARIMAR NOTIFIED. UNIT NOTIFIED, CONTACT NUMBER FOR REPORT
PROVIDED.  ANUSHKA AT THE [facility] MAIN CONTACT  NUMBER [phone].

## 2020-02-10 NOTE — NUR
PATIENT DISCHARGED AT THIS TIME TO THE [facility]. SHE WAS
TRANSPORTED VIA [facility]. RESPIRATIONS ARE EVEN AND UNLABORED. CALLED THE [facility]
TWICE BUT THE NURSES DID NOT  THE PHONE FOR REPORT. WILL AWAIT THEIR
CALL BACK. BROTHER HERE AT TIME OF DISCHARGE.

## 2020-02-11 NOTE — D
[facility]
[address]
[place]   [address]                     DISCHARGE SUMMARY             
_______________________________________________________________________________
 
Name:       ALEXEI AQUINO            Room #:         357-P       [facility] [place]  
M.R.#:      [id number]                       Account #:      [id number]  
Admission:  02/03/20    Attend Phys:    LILO Jaffe
Discharge:  02/10/20    Date of Birth:  [birth year]  
                                                          Report #: [id number]
                                                                    [id number]   
_______________________________________________________________________________
THIS REPORT FOR:  
 
cc:  Niels Kee MD, David W. MD Peters, David W. MD                                           ~
THIS REPORT FOR:   //name//                          
 
CC: Nathan Kee
 
FINAL DIAGNOSES:
1.  Acute on chronic hypoxic hypercapnic respiratory failure.
2.  Chronic obstructive pulmonary disease exacerbation.
3.  Atrial fibrillation.
4.  Mild dementia.
 
HOSPITAL COURSE:  The patient was admitted from the nursing home with shortness
of breath and hypercapnic on hypoxic respiratory failure.  She required BiPAP,
respiratory support and admission to ICU for higher level nursing care.  She
received IV antibiotics, IV steroids, nebulized treatments and supportive
measures.  I discussed the status with her brother at the bedside and do not
resuscitate was continued as per her previous orders and wishes.  Gradually, she
improved with treatment.  By the fourth hospital day, BiPAP was discontinued. 
She was titrated down to 3 to 4 liters nasal cannula oxygen, which was her
baseline from the nursing home.  Steroids were weaned down to oral route and she
continued other home medications.
 
PHYSICAL EXAMINATION:
GENERAL:  On the day of discharge, she was awake and alert, sitting up in the
bedside chair.
VITAL SIGNS:  Temperature 36.6, pulse 64, respirations 16, blood pressure 125/66
and O2 sat 97% on 3 liters.
LUNGS:  Distant, but clear.
HEART:  Regular.  No murmur.
ABDOMEN:  Soft, normoactive bowel sounds.
EXTREMITIES:  No edema.
 
DISPOSITION:  She is discharged to a skilled nursing facility with diet and
activity as tolerated, oxygen at 3 liters.
 
MEDICATIONS:  Prednisone taper over the next 2 weeks down to 10 mg a day,
 
 
 
[facility]
[address]
[place]   [address]                     DISCHARGE SUMMARY             
_______________________________________________________________________________
 
Name:       ALEXEI AQUINO            Room #:         357-P       [facility] [place]  
M.R.#:      [id number]                       Account #:      [id number]  
Admission:  02/03/20    Attend Phys:    LILO Jaffe
Discharge:  02/10/20    Date of Birth:  [birth year]  
                                                          Report #: [id number]
                                                                    [id number]   
_______________________________________________________________________________
cefdinir for 4 more days, Lipitor, Eliquis 5 mg b.i.d., verapamil  mg,
Breo daily, DuoNeb q.i.d., Lasix 40, Toprol-XL 25, Mucinex.
 
 
 
 
 
 
 
 
 
 
 
 
 
 
 
 
 
 
 
 
 
 
 
 
 
 
 
 
 
 
 
 
 
 
 
 
 
 
 
 
 
 
  <ELECTRONICALLY SIGNED>
   By: Niels Kee MD           
  02/11/20     1323
D: 02/10/20 1318                           _____________________________________
T: 02/10/20 1348                           Niels Kee MD             /nt

## 2020-02-12 NOTE — EKG
[facility]
[address]
[place]   [address]                     ELECTROCARDIOGRAM REPORT      
_______________________________________________________________________________
 
Name:       ALEXEI AQUINO            Room #:         357-P       [facility] [place]  
M.R.#:      [id number]                       Account #:      [id number]  
Admission:  20    Attend Phys:    LILO Jaffe
Discharge:  02/10/20    Date of Birth:  [birth year]  
                                                          Report #: [id number]
                                                                    [id number]
_______________________________________________________________________________
THIS REPORT FOR:  
 
cc:  Niels Kee MD, David W. MD Lundgren, Craig H. MD [facility]                                        ~
THIS REPORT FOR:   //name//                          
 
                         [facility] ED
                                       
Test Date:    2020               Test Time:    06:59:35
Pat Name:     ALEXEI AQUINO         Department:   
Patient ID:   [id number]            Room:         241
Gender:       F                        Technician:   NAJMA
:          1935               Requested By: Beka Nguyen
Order Number: [id number] MD:   Panda Lee
                                 Measurements
Intervals                              Axis          
Rate:         142                      P:            
KS:                                    QRS:          13
QRSD:         128                      T:            39
QT:           317                                    
QTc:          487                                    
                           Interpretive Statements
Atrial fibrillation with a rapid ventricular response
Occasional premature ventricular complexes
Right bundle branch block
Compared to ECG 2019 17:23:51
No significant change was found
Electronically Signed On 2-3-2020 17:26:03 CST by Panda Lee
[email or web address]
 
 
 
 
 
 
 
 
 
 
 
 
 
 
  <ELECTRONICALLY SIGNED>
   By: Panda Lee MD, [facility]   
  20     1726
D: 20 0659                           _____________________________________
T: 20 0659                           Panda Lee MD, FAC     /EPI

## 2020-02-12 NOTE — EKG
[facility]
[address] [place]   [address]                     ELECTROCARDIOGRAM REPORT      
_______________________________________________________________________________
 
Name:       ALEXEI AQUINO            Room #:         357-P       [facility] [place]  
M.R.#:      [id number]                       Account #:      [id number]  
Admission:  20    Attend Phys:    LILO Jaffe
Discharge:  02/10/20    Date of Birth:  [birth year]  
                                                          Report #: [id number]
                                                                    [id number]
_______________________________________________________________________________
THIS REPORT FOR:  
 
cc:  Niels Kee MD, David W. MD Lundgren, Craig H. MD [facility]                                        ~
THIS REPORT FOR:   //name//                          
 
                          [facility]
                                       
Test Date:    2020               Test Time:    13:02:56
Pat Name:     ALEXEI AQUINO         Department:   
Patient ID:   [id number]            Room:         241 P
Gender:       F                        Technician:   JORDEN MOLINA
:          1935               Requested By: Nagi Torres
Order Number: [id number] MD:   Panda Lee
                                 Measurements
Intervals                              Axis          
Rate:         128                      P:            
NJ:                                    QRS:          -36
QRSD:         131                      T:            2
QT:           315                                    
QTc:          460                                    
                           Interpretive Statements
Atrial fibrillation
Ventricular premature complex
Right bundle branch block
Inferior infarct, old
Baseline wander in lead(s) III,aVL,V1,V2
Compared to ECG 2020 06:59:35
No significant change was found
Electronically Signed On 2020 16:25:13 CST by Panda Lee
[email or web address]
 
 
 
 
 
 
 
 
 
 
 
 
  <ELECTRONICALLY SIGNED>
   By: Panda Lee MD, [facility]   
  20     1625
D: 20 1302                           _____________________________________
T: 20 1302                           Panda Lee MD, [facility]     /EPI
